# Patient Record
Sex: MALE | Race: OTHER | Employment: UNEMPLOYED | URBAN - METROPOLITAN AREA
[De-identification: names, ages, dates, MRNs, and addresses within clinical notes are randomized per-mention and may not be internally consistent; named-entity substitution may affect disease eponyms.]

---

## 2018-09-21 ENCOUNTER — HOSPITAL ENCOUNTER (OUTPATIENT)
Dept: LAB | Age: 23
Discharge: HOME | End: 2018-09-21

## 2018-09-21 ENCOUNTER — OFFICE VISIT (OUTPATIENT)
Dept: FAMILY MEDICINE CLINIC | Age: 23
End: 2018-09-21

## 2018-09-21 VITALS
WEIGHT: 194 LBS | DIASTOLIC BLOOD PRESSURE: 63 MMHG | HEART RATE: 62 BPM | SYSTOLIC BLOOD PRESSURE: 117 MMHG | TEMPERATURE: 98.1 F

## 2018-09-21 DIAGNOSIS — Z00.00 ROUTINE PHYSICAL EXAMINATION: Primary | ICD-10-CM

## 2018-09-21 DIAGNOSIS — Z11.3 ROUTINE SCREENING FOR STI (SEXUALLY TRANSMITTED INFECTION): ICD-10-CM

## 2018-09-21 DIAGNOSIS — Z00.00 ROUTINE PHYSICAL EXAMINATION: ICD-10-CM

## 2018-09-21 LAB
ALBUMIN SERPL-MCNC: 4 G/DL (ref 3.5–5)
ALBUMIN/GLOB SERPL: 1.3 {RATIO} (ref 1.1–2.2)
ALP SERPL-CCNC: 91 U/L (ref 45–117)
ALT SERPL-CCNC: 32 U/L (ref 12–78)
ANION GAP SERPL CALC-SCNC: 6 MMOL/L (ref 5–15)
AST SERPL-CCNC: 21 U/L (ref 15–37)
BILIRUB SERPL-MCNC: 0.6 MG/DL (ref 0.2–1)
BUN SERPL-MCNC: 14 MG/DL (ref 6–20)
BUN/CREAT SERPL: 17 (ref 12–20)
CALCIUM SERPL-MCNC: 8.3 MG/DL (ref 8.5–10.1)
CHLORIDE SERPL-SCNC: 106 MMOL/L (ref 97–108)
CO2 SERPL-SCNC: 29 MMOL/L (ref 21–32)
CREAT SERPL-MCNC: 0.84 MG/DL (ref 0.7–1.3)
ERYTHROCYTE [DISTWIDTH] IN BLOOD BY AUTOMATED COUNT: 12.6 % (ref 11.5–14.5)
EST. AVERAGE GLUCOSE BLD GHB EST-MCNC: 100 MG/DL
GLOBULIN SER CALC-MCNC: 3.2 G/DL (ref 2–4)
GLUCOSE SERPL-MCNC: 76 MG/DL (ref 65–100)
HBA1C MFR BLD: 5.1 % (ref 4.2–6.3)
HBV SURFACE AG SER QL: <0.1 INDEX
HBV SURFACE AG SER QL: NEGATIVE
HCT VFR BLD AUTO: 45.3 % (ref 36.6–50.3)
HCV AB SERPL QL IA: NONREACTIVE
HCV COMMENT,HCGAC: NORMAL
HGB BLD-MCNC: 15.1 G/DL (ref 12.1–17)
HIV 1+2 AB+HIV1 P24 AG SERPL QL IA: NONREACTIVE
HIV12 RESULT COMMENT, HHIVC: NORMAL
MCH RBC QN AUTO: 31.1 PG (ref 26–34)
MCHC RBC AUTO-ENTMCNC: 33.3 G/DL (ref 30–36.5)
MCV RBC AUTO: 93.4 FL (ref 80–99)
NRBC # BLD: 0 K/UL (ref 0–0.01)
NRBC BLD-RTO: 0 PER 100 WBC
PLATELET # BLD AUTO: 241 K/UL (ref 150–400)
PMV BLD AUTO: 12.1 FL (ref 8.9–12.9)
POTASSIUM SERPL-SCNC: 4.3 MMOL/L (ref 3.5–5.1)
PROT SERPL-MCNC: 7.2 G/DL (ref 6.4–8.2)
RBC # BLD AUTO: 4.85 M/UL (ref 4.1–5.7)
SODIUM SERPL-SCNC: 141 MMOL/L (ref 136–145)
TSH SERPL DL<=0.05 MIU/L-ACNC: 0.96 UIU/ML (ref 0.36–3.74)
WBC # BLD AUTO: 6.2 K/UL (ref 4.1–11.1)

## 2018-09-21 PROCEDURE — 80053 COMPREHEN METABOLIC PANEL: CPT | Performed by: NURSE PRACTITIONER

## 2018-09-21 PROCEDURE — 84443 ASSAY THYROID STIM HORMONE: CPT | Performed by: NURSE PRACTITIONER

## 2018-09-21 PROCEDURE — 86592 SYPHILIS TEST NON-TREP QUAL: CPT | Performed by: NURSE PRACTITIONER

## 2018-09-21 PROCEDURE — 87491 CHLMYD TRACH DNA AMP PROBE: CPT | Performed by: NURSE PRACTITIONER

## 2018-09-21 PROCEDURE — 87340 HEPATITIS B SURFACE AG IA: CPT | Performed by: NURSE PRACTITIONER

## 2018-09-21 PROCEDURE — 87389 HIV-1 AG W/HIV-1&-2 AB AG IA: CPT | Performed by: NURSE PRACTITIONER

## 2018-09-21 PROCEDURE — 86803 HEPATITIS C AB TEST: CPT | Performed by: NURSE PRACTITIONER

## 2018-09-21 PROCEDURE — 85027 COMPLETE CBC AUTOMATED: CPT | Performed by: NURSE PRACTITIONER

## 2018-09-21 PROCEDURE — 83036 HEMOGLOBIN GLYCOSYLATED A1C: CPT | Performed by: NURSE PRACTITIONER

## 2018-09-21 NOTE — PATIENT INSTRUCTIONS
Kavita Snyder o esguince: Instrucciones de cuidado - [ Strain or Sprain: Care Instructions ]  Instrucciones de Hurtis Ridges distensión se produce cuando usted estira demasiado un músculo o tiene un tirón muscular. Un esguince se produce cuando usted estira o se desgarra un ligamento, el tejido resistente que conecta un hueso al [place]. Estos problemas pueden suceder cuando usted hace ejercicio o levanta algo, o cuando tiene un accidente. Descansar y otros cuidados caseros pueden ayudar a sanar las distensiones y los esguinces. El médico lo hagen examinado minuciosamente, kelsey pueden desarrollarse problemas más tarde. Si nota algún problema o nuevos síntomas, busque tratamiento médico de inmediato. La atención de seguimiento es connie parte clave de lee tratamiento y seguridad. Asegúrese de hacer y acudir a todas las citas, y llame a lee médico si está teniendo problemas. También es connie buena idea saber los resultados de janina exámenes y mantener connie lista de los medicamentos que claudia. ¿Cómo puede cuidarse en el hogar? · Si lee médico le ana un cabestrillo, tablilla (férula), aparato ortopédico o inmovilizador, úselo exactamente [place]otah le indicaron. · Descanse la sandro distendida o con esguince, y siga el consejo de lee médico sobre cuándo puede volver a hacer actividad. · Colóquese hielo o connie compresa fría en la sandro adolorida por 10 a 20 minutos a la vez para detener la hinchazón. Trate de hacerlo cada 1 o 2 horas por 3 días (cuando esté despierto) o hasta que la hinchazón disminuya. Póngase un paño yoon entre el hielo y la piel. Mantenga la férula o el aparato ortopédico secos. · Eleve el brazo o la pierna adolorida con connie almohada cuando se aplique hielo o cada vez que se siente o se acueste. Trate de mantenerlo por encima del nivel del corazón. [place] ayudará a reducir la hinchazón. · [employer] analgésicos (medicamentos para el dolor) exactamente [place]akotah le fueron indicados.   ¨ Si el médico le recetó un analgésico, tómelo según lo prescrito. ¨ Si no está tomando un analgésico recetado, pregúntele a lee médico si puede griffin pricila de [employer]. · Faisal los ejercicios recomendados por lee médico o fisioterapeuta. · Regrese poco a poco a lee nivel habitual de [place]. · No faisal nada que empeore el dolor. ¿Cuándo debe pedir ayuda? Llame a lee médico ahora mismo o busque atención médica inmediata si:    · Tiene dolor intenso o que está aumentando.     · Tiene hormigueo, debilidad o entumecimiento en la sandro.     · La sandro se vuelve fría o cambia de color.     · El yeso o la tablilla se sienten demasiado apretados.     · Tiene síntomas de un coágulo sanguíneo, tales dakotah:  ¨ Dolor en la pantorrilla, detrás de la rodilla, en el muslo o en la juli. ¨ Enrojecimiento e hinchazón en la pierna o la juli.     · No puede  la parte distendida del cuerpo.    Vigile de cerca los cambios en lee elizabeth y asegúrese de comunicarse con lee médico si:    · No mejora dakotah se esperaba. ¿Dónde puede encontrar más información en inglés? Nik Crum a http://lizzie-jamee.info/. Long M549 en la búsqueda para aprender más acerca de \"Distensión o esguince: Instrucciones de cuidado - [ Strain or Sprain: Care Instructions ]. \"  Revisado: 29 noviembre, 2017  Versión del contenido: 11.7  © 6478-7480 [employer]. Las instrucciones de cuidado fueron adaptadas bajo licencia por [employer] (which disclaims liability or warranty for this information). Si usted tiene [place] afección médica o sobre estas instrucciones, siempre pregunte a lee profesional de elizabeth. [facility], Incorporated niega toda garantía o responsabilidad por lee uso de esta información.

## 2018-09-21 NOTE — PROGRESS NOTES
Subjective:     Chief Complaint   Patient presents with    Complete Physical     Complete Physical        He  is a 23 y.o. male who presents for routine physical.     Overall good health. Has noted some bilateral pain and weakness on wrist.     Onset 1 month ago w/o injury/trauma. Pain worse w/ ROM, resolves. No assoc swelling nor redness. Pt works in tree work x 4-5 months. Denies any prior Hx of STI screening. PMH: denies     Surg:  Denies     NKDA     Current Rx/supplements: PRN OTC advil for aches/pains. Social:  Pt denies tobacco, etoh nor drug use. Pt lives w/ spouse. Family Hx: Cancer (?)          ROS  Gen - no fever/chills  Resp - no dyspnea or cough  CV - no chest pain or MERCEDES  Rest per HPI    No past medical history on file. No past surgical history on file. No current outpatient prescriptions on file prior to visit. No current facility-administered medications on file prior to visit. Objective:     Vitals:    09/21/18 1011   BP: 117/63   Pulse: 62   Temp: 98.1 °F (36.7 °C)   TempSrc: Oral   Weight: 194 lb (88 kg)       Physical Examination:  General appearance - alert, well appearing, and in no distress  Eyes -sclera anicteric  Neck - supple, no significant adenopathy, no thyromegaly  Chest - clear to auscultation, no wheezes, rales or rhonchi, symmetric air entry  Heart - normal rate, regular rhythm, normal S1, S2, no murmurs, rubs, clicks or gallops  Neurological - alert, oriented, no focal findings or movement disorder noted  Abdomen-BS present/WNL x 4 quads, non-tender/distended, soft,no organomegaly    Assessment/ Plan:   Diagnoses and all orders for this visit:    1. Routine physical examination  -     HIV 1/2 AG/AB, 4TH GENERATION,W RFLX CONFIRM; Future  -     RPR; Future  -     CHLAMYDIA/GC PCR; Future  -     HEPATITIS C AB; Future  -     HEP B SURFACE AG; Future    2.  Routine screening for STI (sexually transmitted infection)  -     CBC W/O DIFF; Future  -     METABOLIC PANEL, COMPREHENSIVE; Future  -     HEMOGLOBIN A1C WITH EAG; Future  -     TSH 3RD GENERATION; Future     Check baseline and STI screening labs. PRN OTC motrin and wrist supports for pain. RTC in 4-6 weeks if not resolved/greatly improved. RTC in 2-3 week for STI/lab result visit. I have discussed the diagnosis with the patient and the intended plan as seen in the above orders. The patient has received an after-visit summary and questions were answered concerning future plans. I have discussed medication side effects and warnings with the patient as well. The patient verbalizes understanding and agreement with the plan.     Follow-up Disposition: Not on File

## 2018-09-21 NOTE — MR AVS SNAPSHOT
[address] [place] [address] [address] [id number] [phone] Patient: Darrell Santoyo MRN: [id number] :1995 Visit Information Jose Polk Personal Médico Departamento Teléfono del Dep. Número de visita 2018 10:30 AM Jensen Cordova, [address] [place] [address] [phone] Upcoming Health Maintenance Date Due Hepatitis A Peds Age 1-18 (1 of 2 - Standard Series) 10/4/1998 DTaP/Tdap/Td series (1 - Tdap) 10/4/2004 HPV Age 9Y-34Y (1 of 1 - Male 3 Dose Series) 10/4/2008 Influenza Age 5 to Adult 2018 Alergias  Review Complete El: 2018 Por: RYDER Malin del:  2018 No Known Allergies Vacunas actuales Leory Garcia No hay ninguna vacuna archivada. No revisadas esta visita You Were Diagnosed With   
  
 Bob Ballard Routine physical examination    -  Primary ICD-10-CM: Z00.00 ICD-9-CM: V70.0 Routine screening for STI (sexually transmitted infection)     ICD-10-CM: Z11.3 ICD-9-CM: V74.5 Partes vitales PS Pulso Temperatura Peso (percentil de crecimiento) Estatus de tabaquísmo 117/63 (BP 1 Location: Right arm, BP Patient Position: Sitting) 62 98.1 °F (36.7 °C) (Oral) 194 lb (88 kg) Never Smoker Historial de signos vitales [facility] Name Phone [address] [phone] Rodríguez lista de medicamentos actualizada Aviso  As of 2018 11:42 AM  
 No se le ha recetado ningún medicamento. Por hacer   
 2018 Lab:  CBC W/O DIFF   
  
 2018 Microbiology:  CHLAMYDIA/GC PCR   
  
 2018 Lab:  HEMOGLOBIN A1C WITH EAG   
  
 2018 Lab:  HEP B SURFACE AG   
  
 2018 Lab:  HEPATITIS C AB   
  
 2018 Lab:  HIV 1/2 AG/AB, 4TH GENERATION,W RFLX CONFIRM   
  
 2018 Lab: METABOLIC PANEL, COMPREHENSIVE   
  
 09/21/2018 Lab:  RPR   
  
 09/21/2018 Lab:  TSH 3RD GENERATION Instrucciones para el Paciente Cherry Cera o esguince: Instrucciones de cuidado - [ Strain or Sprain: Care Instructions ] Instrucciones de cuidado Mario Tiffanie se produce cuando usted estira demasiado un músculo o tiene un tirón muscular. Un esguince se produce cuando usted estira o se desgarra un ligamento, el tejido resistente que conecta un hueso al [place]. Estos problemas pueden suceder cuando usted hace ejercicio o levanta algo, o cuando tiene un accidente. Descansar y otros cuidados caseros pueden ayudar a sanar las distensiones y los esguinces. El médico lo hagen examinado minuciosamente, kelsey pueden desarrollarse problemas más tarde. Si nota algún problema o nuevos síntomas, busque tratamiento médico de inmediato. La atención de seguimiento es connie parte clave de lee tratamiento y seguridad. Asegúrese de hacer y acudir a todas las citas, y llame a lee médico si está teniendo problemas. También es connie buena idea saber los resultados de janina exámenes y mantener connie lista de los medicamentos que claudia. Cómo puede cuidarse en el hogar? · Si lee médico le ana un cabestrillo, tablilla (férula), aparato ortopédico o inmovilizador, úselo exactamente [place]ot[place] le indicaron. · Descanse la sandro distendida o con esguince, y siga el consejo de lee médico sobre cuándo puede volver a hacer actividad. · Colóquese hielo o connie compresa fría en la sandro adolorida por 10 a 20 minutos a la vez para detener la hinchazón. Trate de hacerlo cada 1 o 2 horas por 3 días (cuando esté despierto) o hasta que la hinchazón disminuya. Póngase un paño yoon entre el hielo y la piel. Mantenga la férula o el aparato ortopédico secos. · Eleve el brazo o la pierna adolorida con connie almohada cuando se aplique hielo o cada vez que se siente o se acueste.  Trate de mantenerlo por encima del nivel del corazón. [place] ayudará a reducir la hinchazón. · [employer] analgésicos (medicamentos para el dolor) exactamente [place]ot[place] le fueron indicados. ¨ Si el médico le recetó un analgésico, tómelo según lo prescrito. ¨ Si no está tomando un analgésico recetado, pregúntele a lee médico si puede griffin pricila de [employer]. · Faisal los ejercicios recomendados por lee médico o fisioterapeuta. · Regrese poco a poco a lee nivel habitual de [place]. · No faisal nada que empeore el dolor. Cuándo debe pedir ayuda? Llame a lee médico ahora mismo o busque atención médica inmediata si: 
  · Tiene dolor intenso o que está aumentando.  
  · Tiene hormigueo, debilidad o entumecimiento en la sandro.  
  · La sandro se vuelve fría o cambia de color.  
  · El yeso o la tablilla se sienten demasiado apretados.  
  · Tiene síntomas de un coágulo sanguíneo, tales dakotah: ¨ Dolor en la pantorrilla, detrás de la rodilla, en el muslo o en la juli. ¨ Enrojecimiento e hinchazón en la pierna o la juli.  
  · No puede  la parte distendida del cuerpo.  
 Vigile de cerca los cambios en lee elizabeth y asegúrese de comunicarse con lee médico si: 
  · No mejora dakotah se esperaba. Dónde puede encontrar más información en inglés? Erna Parada a [email or web address]-jamee.[email or web address] Marya Smith X198 en la búsqueda para aprender más acerca de \"Distensión o esguince: Instrucciones de cuidado - [ Strain or Sprain: Care Instructions ]. \" 
Revisado: 29 noviembre, 2017 Versión del contenido: 11.7 © 4220-3822 [employer]. Las instrucciones de cuidado fueron adaptadas bajo licencia por [employer] (which disclaims liability or warranty for this information). Si usted tiene [place] afección médica o sobre estas instrucciones, siempre pregunte a lee profesional de elizabeth. [employer], [employer] niega toda garantía o responsabilidad por lee uso de esta información. Introducing [facility]! [facility] introduce portal paciente MyChart . Ahora se puede acceder a partes de lee expediente médico, enviar por correo electrónico la oficina de lee médico y solicitar renovaciones de medicamentos en línea. En lee navegador de Internet , Atif Fontenot a [email or web address] David clic en el usuario por [place]? Clepierce Dickens clic aquí en la sesión [facility]. Verá la página de registro [place]. Ingrese lee código de [employer] gabriella y dakotah aparece a continuación. Usted no tendrá que [employer] código después de jeremy completado el proceso de registro . Si usted no se inscribe antes de la fecha de caducidad , debe solicitar un nuevo código. · MyChart Código de acceso : [id number] Expires: 12/20/2018 11:42 AM 
 
Ingresa los últimos cuatro dígitos de lee Número de Seguro Social ( xxxx ) y fecha de nacimiento ( dd / mm / aaaa ) dakotah se indica y david clic en Enviar. Usted será llevado a la siguiente página de registro . Crear un ID MyChart . Esta será lee ID de inicio de sesión de MyChart y no puede ser [place] , por lo que pensar en connie que es [place] y fácil de recordar . Crear connie contraseña MyChart . Usted puede cambiar lee contraseña en cualquier momento . Ingrese lee Password Reset de preguntas y [place] . [place] se puede utilizar en un momento posterior si usted olvida lee contraseña. Introduzca lee dirección de correo electrónico . Hoy Reining recibirá connie notificación por correo electrónico cuando la nueva información está disponible en MyChart . Pama Hugger clic en Registrarse. Clearance Sitter tamir y descargar porciones de lee expediente médico. 
David clic en el enlace de descarga del menú Resumen para descargar connie copia portátil de lee información médica . Si tiene [employer] , por favor visite la sección de preguntas frecuentes del sitio web MyChart . Recuerde, MyChart NO es que se utilizará para las necesidades urgentes. Para emergencias médicas , llame al 911 . Ahora disponible en lee iPhone y Android ! Por favor proporcione umer resumen de la documentación de cuidado a lee próximo proveedor. If you have any questions after today's visit, please call [phone].

## 2018-09-21 NOTE — PROGRESS NOTES
AVS reviewed and given.  Patient verbalized understanding, no questions or concerns at this time Yolanda Godfrey RN

## 2018-09-24 LAB
C TRACH DNA SPEC QL NAA+PROBE: NEGATIVE
N GONORRHOEA DNA SPEC QL NAA+PROBE: NEGATIVE
RPR SER QL: NONREACTIVE
SAMPLE TYPE: NORMAL
SERVICE CMNT-IMP: NORMAL
SPECIMEN SOURCE: NORMAL

## 2018-10-12 ENCOUNTER — TELEPHONE (OUTPATIENT)
Dept: FAMILY MEDICINE CLINIC | Age: 23
End: 2018-10-12

## 2018-10-12 NOTE — PROGRESS NOTES
Routed message to call back reg as a pt call message to please call pt to schedule nurse visit to review labs.  Leila Rodriguez RN

## 2018-10-12 NOTE — TELEPHONE ENCOUNTER
Routing message to call back reg to call pt to schedule nurse visit for lab results.  Adams Rivera RN

## 2018-10-12 NOTE — PROGRESS NOTES
This was sent to CVAN call back and CVAN call back reg and realize now it is SJO pt.  Francia Dos Santos RN

## 2018-10-15 NOTE — TELEPHONE ENCOUNTER
Per Tara Chavez's request, I have asked  to call pt to schedule nurse visit if he would like to review lab results.  Souleymane Esposito RN

## 2018-10-24 NOTE — TELEPHONE ENCOUNTER
Tel call placed to patient, he is unable to come in for lab results right now as he gets off work at 5:30pm. Explained to patient that this message would be routed to the provider and if he does have an available time to come in during [employer] business hours to please call the [facility] number to schedule a nurse visit.

## 2018-10-25 NOTE — PROGRESS NOTES
See previous tel call note that was documented by me. Spoke to patient about nurse visit, pt declined appt as he works until 5:30pm he said he would call [address] [facility] to schedule another time.

## 2021-12-25 ENCOUNTER — HOSPITAL ENCOUNTER (EMERGENCY)
Age: 26
Discharge: HOME | End: 2021-12-25
Attending: EMERGENCY MEDICINE

## 2021-12-25 VITALS
WEIGHT: 210 LBS | OXYGEN SATURATION: 98 % | SYSTOLIC BLOOD PRESSURE: 131 MMHG | HEART RATE: 67 BPM | TEMPERATURE: 98.2 F | BODY MASS INDEX: 31.1 KG/M2 | RESPIRATION RATE: 16 BRPM | DIASTOLIC BLOOD PRESSURE: 62 MMHG | HEIGHT: 69 IN

## 2021-12-25 DIAGNOSIS — L23.7 POISON IVY DERMATITIS: Primary | ICD-10-CM

## 2021-12-25 PROCEDURE — 99281 EMR DPT VST MAYX REQ PHY/QHP: CPT

## 2021-12-25 RX ORDER — METHYLPREDNISOLONE 4 MG/1
TABLET ORAL
Qty: 1 DOSE PACK | Refills: 0 | Status: SHIPPED | OUTPATIENT
Start: 2021-12-25

## 2021-12-25 NOTE — ED PROVIDER NOTES
This is a 33-year-old male who presents ambulatory to the emergency room with complaints of a diffuse rash that started on his abdomen. 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 This is pruritic in nature. Nondraining. Unsure if he came in contact with poison ivy but states \"I am not allergic to poison ivy. \"  Patient is denying any change in soaps, detergents. Has been taking Benadryl with no improvement of his symptoms. Denies any chest pain, shortness of breath, dizziness, nausea or vomiting, fevers or chills. There are no further complaints at this time. None  No past medical history on file. No past surgical history on file. No past medical history on file. No past surgical history on file. No family history on file. Social History     Socioeconomic History    Marital status: SINGLE     Spouse name: Not on file    Number of children: Not on file    Years of education: Not on file    Highest education level: Not on file   Occupational History    Not on file   Tobacco Use    Smoking status: Never Smoker    Smokeless tobacco: Never Used   Substance and Sexual Activity    Alcohol use: No    Drug use: No    Sexual activity: Not on file   Other Topics Concern    Not on file   Social History Narrative    Not on file     Social Determinants of Health     Financial Resource Strain:     Difficulty of Paying Living Expenses: Not on file   Food Insecurity:     Worried About Running Out of Food in the Last Year: Not on file    Mandy of Food in the Last Year: Not on file   Transportation Needs:     Lack of Transportation (Medical): Not on file    Lack of Transportation (Non-Medical):  Not on file   Physical Activity:     Days of Exercise per Week: Not on file    Minutes of Exercise per Session: Not on file   Stress:     Feeling of Stress : Not on file Social Connections:     Frequency of Communication with Friends and Family: Not on file    Frequency of Social Gatherings with Friends and Family: Not on file    Attends Episcopal Services: Not on file    Active Member of Clubs or Organizations: Not on file    Attends Club or Organization Meetings: Not on file    Marital Status: Not on file   Intimate Partner Violence:     Fear of Current or Ex-Partner: Not on file    Emotionally Abused: Not on file    Physically Abused: Not on file    Sexually Abused: Not on file   Housing Stability:     Unable to Pay for Housing in the Last Year: Not on file    Number of [place] in the Last Year: Not on file    Unstable Housing in the Last Year: Not on file         ALLERGIES: Patient has no known allergies. Review of Systems   Constitutional: Negative for activity change, appetite change, chills, fatigue and fever. HENT: Negative for congestion, ear discharge, ear pain, sinus pressure, sinus pain, sore throat and trouble swallowing. Eyes: Negative for photophobia, pain, redness, itching and visual disturbance. Respiratory: Negative for chest tightness and shortness of breath. Cardiovascular: Negative for chest pain and palpitations. Gastrointestinal: Negative for abdominal distention, abdominal pain, nausea and vomiting. Endocrine: Negative. Genitourinary: Negative for difficulty urinating, frequency and urgency. Musculoskeletal: Negative for back pain, neck pain and neck stiffness. Skin: Positive for rash. Negative for color change, pallor and wound. Allergic/Immunologic: Negative. Neurological: Negative for dizziness, syncope, weakness and headaches. Hematological: Does not bruise/bleed easily. Psychiatric/Behavioral: Negative for behavioral problems. The patient is not nervous/anxious.         Vitals:    12/25/21 1204   BP: 131/62   Pulse: 67   Resp: 16   Temp: 98.2 °F (36.8 °C)   SpO2: 98%   Weight: 95.3 kg (210 lb)   Height: 5' 9\" (1.753 m)            Physical Exam  Vitals and nursing note reviewed. Constitutional:       General: He is not in acute distress. Appearance: Normal appearance. He is well-developed. He is not ill-appearing. HENT:      Head: Normocephalic and atraumatic. Right Ear: External ear normal.      Left Ear: External ear normal.      Nose: Nose normal.      Mouth/Throat:      Mouth: Mucous membranes are moist.   Eyes:      General:         Right eye: No discharge. Left eye: No discharge. Conjunctiva/sclera: Conjunctivae normal.      Pupils: Pupils are equal, round, and reactive to light. Neck:      Vascular: No JVD. Trachea: No tracheal deviation. Cardiovascular:      Rate and Rhythm: Normal rate and regular rhythm. Pulses: Normal pulses. Heart sounds: Normal heart sounds. No murmur heard. No gallop. Pulmonary:      Effort: Pulmonary effort is normal. No respiratory distress. Breath sounds: Normal breath sounds. No wheezing or rales. Chest:      Chest wall: No tenderness. Abdominal:      General: Bowel sounds are normal. There is no distension. Palpations: Abdomen is soft. Tenderness: There is no abdominal tenderness. There is no guarding or rebound. Genitourinary:     Comments: Deferred    Musculoskeletal:         General: No tenderness. Normal range of motion. Cervical back: Normal range of motion and neck supple. Skin:     General: Skin is warm and dry. Capillary Refill: Capillary refill takes less than 2 seconds. Coloration: Skin is not pale. Findings: Rash present. No erythema. Neurological:      General: No focal deficit present. Mental Status: He is alert and oriented to person, place, and time. Coordination: Coordination normal.   Psychiatric:         Mood and Affect: Mood normal.         Behavior: Behavior normal.         Thought Content:  Thought content normal.         Judgment: Judgment normal. MDM  Number of Diagnoses or Management Options  Poison ivy dermatitis: new and does not require workup  Diagnosis management comments: Differential diagnosis includes contact dermatitis, poison oak, poison ivy and others. After physical assessment, no indication for laboratory data or imaging. Discharged home with steroids. Return to the emergency room with worsening symptoms. Otherwise follow-up with PCP. Patient in agreement with plan of care. Labs Reviewed - No data to display  No results found. 1:39 PM  Pt has been reexamined. Pt has no new complaints, changes or physical findings. Care plan outlined and precautions discussed. All available results were reviewed with pt. All medications were reviewed with pt. All of pt's questions and concerns were addressed. Pt agrees to F/U as instructed and agrees to return to ED upon further deterioration. Pt is ready to go home. Marquis Lopez NP    Please note that this dictation was completed with Pycno, the computer voice recognition software. Quite often unanticipated grammatical, syntax, homophones, and other interpretive errors are inadvertently transcribed by the computer software. Please disregard these errors. Please excuse any errors that have escaped final proofreading. Thank you.     Procedures

## 2021-12-25 NOTE — ED TRIAGE NOTES
Patient reports rush around umbilicus started 3 weeks ago and now it is spread to his entire body. Patient took Benadryl and anti itching cream for 3 days with no relieve. Denies other symptoms, no new products.

## 2021-12-25 NOTE — Clinical Note
[address]  [facility] EMERGENCY DEPT  [address] [id number]  [phone]    Work/School Note    Date: 12/25/2021    To Whom It May concern:    Beatriz Bautista was seen and treated today in the emergency room by the following provider(s):  Attending Provider: Richardson Buerger, DO  Nurse Practitioner: Elma Sheldon NP. Beatriz Bautista is excused from work/school on 12/25/21 and 12/26/21. He is medically clear to return to work/school on 12/27/2021.        Sincerely,          Corinne Pena NP

## 2021-12-25 NOTE — Clinical Note
[address]  [facility] EMERGENCY DEPT  [address] [phone]  [phone]    Work/School Note    Date: 12/25/2021    To Whom It May concern:    Edmundo Hermosillo was seen and treated today in the emergency room by the following provider(s):  Attending Provider: Eris Sapp DO  Nurse Practitioner: Florence Cee NP. Edmundo Hermosillo is excused from work/school on 12/25/21 and 12/26/21. He is medically clear to return to work/school on 12/27/2021.        Sincerely,          Amira Hair NP

## 2022-01-19 ENCOUNTER — HOSPITAL ENCOUNTER (EMERGENCY)
Age: 27
Discharge: HOME | End: 2022-01-19
Attending: EMERGENCY MEDICINE

## 2022-01-19 VITALS
SYSTOLIC BLOOD PRESSURE: 130 MMHG | OXYGEN SATURATION: 98 % | TEMPERATURE: 98.5 F | HEIGHT: 69 IN | HEART RATE: 61 BPM | DIASTOLIC BLOOD PRESSURE: 79 MMHG | RESPIRATION RATE: 17 BRPM | WEIGHT: 210 LBS | BODY MASS INDEX: 31.1 KG/M2

## 2022-01-19 DIAGNOSIS — R21 RASH AND OTHER NONSPECIFIC SKIN ERUPTION: Primary | ICD-10-CM

## 2022-01-19 PROCEDURE — 99281 EMR DPT VST MAYX REQ PHY/QHP: CPT

## 2022-01-19 RX ORDER — HYDROXYZINE 50 MG/1
50 TABLET, FILM COATED ORAL
Qty: 20 TABLET | Refills: 0 | Status: SHIPPED | OUTPATIENT
Start: 2022-01-19 | End: 2022-01-29

## 2022-01-19 RX ORDER — PREDNISONE 10 MG/1
TABLET ORAL
Qty: 12 TABLET | Refills: 0 | Status: SHIPPED | OUTPATIENT
Start: 2022-01-19

## 2022-01-20 NOTE — ED PROVIDER NOTES
42-year-old male with no significant PMH presents to ED with 4 weeks of worsening rash. Patient reports that starting about a month ago he started having a pruritic rash. Came into the ED and was diagnosed with poison ivy dermatitis and prescribed a Medrol Dosepak but it did not improve. A week later his girlfriend started experiencing the same pruritic rash. He went to a PCP 2 days ago who diagnosed them with scabies and gave them prescription for hydrocortisone cream and permethrin cream.  However, they feel that his symptoms have only worsened since then. He reports that the itching is so bad that they have not been able to sleep in several nights. They have tried to wash all sheets, towels and clothes in very hot water but have not noticed any relief. Denies any new exposures. They do live in a house with other people but they are the only ones having the symptoms. Denies any fevers, chills, SOB, cough, drainage. The history is provided by the patient and a relative. A  was used. Skin Problem  Pertinent negatives include no chest pain and no headaches. No past medical history on file. No past surgical history on file. No family history on file.     Social History     Socioeconomic History    Marital status: SINGLE     Spouse name: Not on file    Number of children: Not on file    Years of education: Not on file    Highest education level: Not on file   Occupational History    Not on file   Tobacco Use    Smoking status: Never Smoker    Smokeless tobacco: Never Used   Substance and Sexual Activity    Alcohol use: No    Drug use: No    Sexual activity: Not on file   Other Topics Concern    Not on file   Social History Narrative    Not on file     Social Determinants of Health     Financial Resource Strain:     Difficulty of Paying Living Expenses: Not on file   Food Insecurity:     Worried About Running Out of Food in the Last Year: Not on file    Ran Out of Food in the Last Year: Not on file   Transportation Needs:     Lack of Transportation (Medical): Not on file    Lack of Transportation (Non-Medical): Not on file   Physical Activity:     Days of Exercise per Week: Not on file    Minutes of Exercise per Session: Not on file   Stress:     Feeling of Stress : Not on file   Social Connections:     Frequency of Communication with Friends and Family: Not on file    Frequency of Social Gatherings with Friends and Family: Not on file    Attends Rastafarian Services: Not on file    Active Member of [address] [place] [address] [employer] or Organizations: Not on file    Attends Club or Organization Meetings: Not on file    Marital Status: Not on file   Intimate Partner Violence:     Fear of Current or Ex-Partner: Not on file    Emotionally Abused: Not on file    Physically Abused: Not on file    Sexually Abused: Not on file   Housing Stability:     Unable to Pay for Housing in the Last Year: Not on file    Number of [place] in the Last Year: Not on file    Unstable Housing in the Last Year: Not on file         ALLERGIES: Patient has no known allergies. Review of Systems   Constitutional: Negative for fever. HENT: Negative for congestion and sinus pain. Respiratory: Negative for cough. Cardiovascular: Negative for chest pain. Gastrointestinal: Negative for nausea and vomiting. Genitourinary: Negative for dysuria. Musculoskeletal: Negative for myalgias. Skin: Positive for rash. Neurological: Negative for headaches. Hematological: Negative for adenopathy. All other systems reviewed and are negative. Vitals:    01/19/22 2313   BP: 130/79   Pulse: 61   Resp: 17   Temp: 98.5 °F (36.9 °C)   SpO2: 98%   Weight: 95.3 kg (210 lb)   Height: 5' 9\" (1.753 m)            Physical Exam  Vitals and nursing note reviewed. Constitutional:       Appearance: Normal appearance. Eyes:      Extraocular Movements: Extraocular movements intact.       Pupils: Pupils are equal, round, and reactive to light. Cardiovascular:      Rate and Rhythm: Normal rate and regular rhythm. Heart sounds: Normal heart sounds. Pulmonary:      Effort: Pulmonary effort is normal.      Breath sounds: Normal breath sounds. Abdominal:      Palpations: Abdomen is soft. Tenderness: There is no abdominal tenderness. Lymphadenopathy:      Cervical: No cervical adenopathy. Skin:     General: Skin is warm and dry. Findings: Rash (Several areas of erythematous papular rash worse on back and bilateral arms. Evidence of excoriation.) present. Neurological:      General: No focal deficit present. Mental Status: He is alert and oriented to person, place, and time. Psychiatric:         Mood and Affect: Mood normal.         Behavior: Behavior normal.          MDM  Number of Diagnoses or Management Options  Rash and other nonspecific skin eruption  Diagnosis management comments: 82-year-old male with no significant PMH presents to ED with 4 weeks of worsening rash. Physical exam reveals erythematous papular rash with evidence of excoriation. Patient has failed outpatient treatment of hydrocortisone and permethrin cream.  Patient will be instructed that he needs to have house fumigated and professionally evaluated by an . He also needs to follow-up with a dermatologist.  He will be discharged with prescription for Atarax and prednisone and will be instructed regarding conservative care and follow-up.        Amount and/or Complexity of Data Reviewed  Discuss the patient with other providers: yes           Procedures

## 2023-04-21 ENCOUNTER — APPOINTMENT (OUTPATIENT)
Dept: ULTRASOUND IMAGING | Age: 28
End: 2023-04-21

## 2023-04-21 ENCOUNTER — HOSPITAL ENCOUNTER (EMERGENCY)
Age: 28
Discharge: HOME | End: 2023-04-21
Attending: STUDENT IN AN ORGANIZED HEALTH CARE EDUCATION/TRAINING PROGRAM

## 2023-04-21 VITALS
OXYGEN SATURATION: 99 % | HEART RATE: 72 BPM | BODY MASS INDEX: 31.1 KG/M2 | TEMPERATURE: 98.6 F | WEIGHT: 210 LBS | RESPIRATION RATE: 16 BRPM | DIASTOLIC BLOOD PRESSURE: 68 MMHG | SYSTOLIC BLOOD PRESSURE: 146 MMHG | HEIGHT: 69 IN

## 2023-04-21 DIAGNOSIS — N50.89 TESTICULAR SWELLING, RIGHT: Primary | ICD-10-CM

## 2023-04-21 LAB
APPEARANCE UR: CLEAR
BACTERIA URNS QL MICRO: NEGATIVE /HPF
BILIRUB UR QL: NEGATIVE
COLOR UR: ABNORMAL
EPITH CASTS URNS QL MICRO: ABNORMAL /LPF
GLUCOSE UR STRIP.AUTO-MCNC: NEGATIVE MG/DL
HGB UR QL STRIP: NEGATIVE
HYALINE CASTS URNS QL MICRO: ABNORMAL /LPF (ref 0–2)
KETONES UR QL STRIP.AUTO: ABNORMAL MG/DL
LEUKOCYTE ESTERASE UR QL STRIP.AUTO: NEGATIVE
NITRITE UR QL STRIP.AUTO: NEGATIVE
PH UR STRIP: 6 (ref 5–8)
PROT UR STRIP-MCNC: 30 MG/DL
RBC #/AREA URNS HPF: ABNORMAL /HPF (ref 0–5)
SP GR UR REFRACTOMETRY: >1.03 (ref 1–1.03)
UR CULT HOLD, URHOLD: NORMAL
UROBILINOGEN UR QL STRIP.AUTO: 1 EU/DL (ref 0.2–1)
WBC URNS QL MICRO: ABNORMAL /HPF (ref 0–4)

## 2023-04-21 PROCEDURE — 99284 EMERGENCY DEPT VISIT MOD MDM: CPT

## 2023-04-21 PROCEDURE — 87591 N.GONORRHOEAE DNA AMP PROB: CPT

## 2023-04-21 PROCEDURE — 81001 URINALYSIS AUTO W/SCOPE: CPT

## 2023-04-21 PROCEDURE — 76870 US EXAM SCROTUM: CPT

## 2023-04-21 PROCEDURE — 96372 THER/PROPH/DIAG INJ SC/IM: CPT

## 2023-04-21 PROCEDURE — 74011250636 HC RX REV CODE- 250/636

## 2023-04-21 RX ORDER — KETOROLAC TROMETHAMINE 30 MG/ML
30 INJECTION, SOLUTION INTRAMUSCULAR; INTRAVENOUS
Status: COMPLETED | OUTPATIENT
Start: 2023-04-21 | End: 2023-04-21

## 2023-04-21 RX ADMIN — KETOROLAC TROMETHAMINE 30 MG: 30 INJECTION, SOLUTION INTRAMUSCULAR at 17:59

## 2023-04-21 NOTE — ED TRIAGE NOTES
Pt comes to ED c/o R testicle pain starting yesterday between 1600 and 1700 that started all of a sudden. Patient denies any penile discharge or penile pain. Pt reports swelling in R testicle. Pt took Advil at 1400 today for pain without any improvement. Pt denies any injury to genital area.

## 2023-04-21 NOTE — ED PROVIDER NOTES
Brennan Gil is a 24 y.o. male presenting to the ED c/o right testicular pain since yesterday around 4 pm-5 pm. + swelling of right testicle. Pt reports taking advil to help with pain w/o much relief. Last dose advil at 2 PM.     Denies penile discharge, penile pain, fever, chills, injury to testicle. Medical hx: denies. Denies asthma, denies DM  Surgical hx: denies      The history is provided by the patient. The history is limited by a language barrier. A  was used. No past medical history on file. No past surgical history on file. No family history on file. Social History     Socioeconomic History    Marital status: SINGLE     Spouse name: Not on file    Number of children: Not on file    Years of education: Not on file    Highest education level: Not on file   Occupational History    Not on file   Tobacco Use    Smoking status: Never    Smokeless tobacco: Never   Substance and Sexual Activity    Alcohol use: No    Drug use: No    Sexual activity: Not on file   Other Topics Concern    Not on file   Social History Narrative    Not on file     Social Determinants of Health     Financial Resource Strain: Not on file   Food Insecurity: Not on file   Transportation Needs: Not on file   Physical Activity: Not on file   Stress: Not on file   Social Connections: Not on file   Intimate Partner Violence: Not on file   Housing Stability: Not on file         ALLERGIES: Patient has no known allergies. Review of Systems   Constitutional:  Negative for activity change, appetite change, chills and fever. Gastrointestinal:  Negative for abdominal pain, nausea and vomiting. Genitourinary:  Positive for scrotal swelling and testicular pain. Negative for decreased urine volume, difficulty urinating, penile discharge, penile pain and penile swelling. Musculoskeletal:  Negative for myalgias. Skin:  Negative for rash. All other systems reviewed and are negative.     There were no vitals filed for this visit. Physical Exam  Vitals reviewed. Exam conducted with a chaperone present (ER RN). Constitutional:       General: He is not in acute distress. Appearance: Normal appearance. He is normal weight. HENT:      Nose: No rhinorrhea. Pulmonary:      Effort: Pulmonary effort is normal.   Abdominal:      General: Bowel sounds are normal.      Palpations: Abdomen is soft. Genitourinary:     Testes:         Right: Tenderness and swelling present. Mass not present. Left: Tenderness or swelling not present. Skin:     General: Skin is warm and dry. Capillary Refill: Capillary refill takes less than 2 seconds. Neurological:      Mental Status: He is alert and oriented to person, place, and time. Mental status is at baseline. Cranial Nerves: No cranial nerve deficit. Gait: Gait normal.        Medical Decision Making  Amount and/or Complexity of Data Reviewed  Labs: ordered. Radiology: ordered. Risk  Prescription drug management. Pt is a 25 y/o male presenting to the ED c/o right testicular pain and swelling since yesterday. Doubt injury given pt denies recent falls or injury. Doubt UTI given UA was negative for nitrites, leukocyte esterase, elevated WBC, or bacteria. Doubt testicular torsion given ultrasound showed \"normal echotexture and vascularity of the testes w/o evidence of tumor, torsion, or inflammation\". Ultrasound did note a \"right epididymal head containing 1.5 cm maximum diameter cyst\" but epididymides was otherwise unremarkable. Urine gc/chlamydia was sent and is currently in process. Pt educated about reviewing results on [facility] for result. Pt is stable for discharge home. Pt encouraged to follow-up with urologiy in 3-4 days. Strict return to ED precautions given. ACI discussed with the patient; see instruction below. Patient verbalized understanding.       Procedures  N/A    LABORATORY TESTS:  Recent Results (from the past 12 hour(s)) URINALYSIS W/MICROSCOPIC    Collection Time: 04/21/23  6:48 PM   Result Value Ref Range    Color YELLOW/STRAW      Appearance CLEAR CLEAR      Specific gravity >1.030 (H) 1.003 - 1.030    pH (UA) 6.0 5.0 - 8.0      Protein 30 (A) NEG mg/dL    Glucose Negative NEG mg/dL    Ketone TRACE (A) NEG mg/dL    Bilirubin Negative NEG      Blood Negative NEG      Urobilinogen 1.0 0.2 - 1.0 EU/dL    Nitrites Negative NEG      Leukocyte Esterase Negative NEG      WBC 0-4 0 - 4 /hpf    RBC 0-5 0 - 5 /hpf    Epithelial cells FEW FEW /lpf    Bacteria Negative NEG /hpf    Hyaline cast 0-2 0 - 2 /lpf   URINE CULTURE HOLD SAMPLE    Collection Time: 04/21/23  6:48 PM    Specimen: Urine   Result Value Ref Range    Urine culture hold        Urine on hold in Microbiology dept for 2 days. If unpreserved urine is submitted, it cannot be used for addtional testing after 24 hours, recollection will be required. IMAGING RESULTS:  US SCROTUM/TESTICLES   Final Result   No acute finding. MEDICATIONS GIVEN:  Medications   ketorolac (TORADOL) injection 30 mg (30 mg IntraMUSCular Given 4/21/23 4466)       IMPRESSION:  1. Testicular swelling, right        PLAN:  1. Take ibuprofen and tylenol as needed for pain. Take ibuprofen with food. Discharge Medication List as of 4/21/2023  7:23 PM        2. Follow-up with urology in 3-4 days. Follow-up Information       Follow up With Specialties Details Why Mir Valdes MD Urology Schedule an appointment as soon as possible for a visit in 3 days  [address] [phone]            3.  Return to ED if worse     Signed By: ALBERTO Jain     April 21, 2023

## 2023-04-21 NOTE — DISCHARGE INSTRUCTIONS
Take ibuprofen and tylenol as needed for pain. Take ibuprofen with food. You can check your urine results on MyChart. Ultrasound showed:     \"FINDINGS:   Scrotal sonogram shows normal echotexture and vascularity of the testes with no  evidence of tumor, torsion or inflammation. Right measures 5.4 x 3.3 x 2.5 cm with volume 22.7 mL. Left measures 5.0 x 3.6 x 2.5 cm with volume 23.9 mL. There is no overt hydrocele or varicocele, and no hernia is seen. Right epididymal head contains a 1.5 cm maximum diameter cyst. Epididymides are  otherwise unremarkable, with no inflammatory hypervascularity on color Doppler. IMPRESSION  No acute finding. \"    Follow-up with urologist in 3-4 days.

## 2023-04-22 LAB
C TRACH DNA SPEC QL NAA+PROBE: POSITIVE
N GONORRHOEA DNA SPEC QL NAA+PROBE: NEGATIVE
SAMPLE TYPE: ABNORMAL
SERVICE CMNT-IMP: ABNORMAL
SPECIMEN SOURCE: ABNORMAL

## 2023-04-22 NOTE — PROGRESS NOTES
With [facility] [employer] [place] interpretor #[id number], I attempted to reach the patient and/or the patient's parent to discuss available lab result.  Message was left for call back concerning lab result

## 2023-05-18 RX ORDER — METHYLPREDNISOLONE 4 MG/1
TABLET ORAL
COMMUNITY
Start: 2021-12-25

## 2023-05-18 RX ORDER — PREDNISONE 10 MG/1
TABLET ORAL
COMMUNITY
Start: 2022-01-19

## 2025-04-21 NOTE — PROGRESS NOTES
All available testing is reviewed and is neg. Hep B and C results were not available at this time.  He can have a nurse visit to discuss the available results Render Risk Assessment In Note?: no Detail Level: Simple Additional Notes: Patient consent was obtained to proceed with the vista and recommended plan of care after discussion of all risks and benefits including the risks of Covid-19 exposure.